# Patient Record
Sex: FEMALE | URBAN - METROPOLITAN AREA
[De-identification: names, ages, dates, MRNs, and addresses within clinical notes are randomized per-mention and may not be internally consistent; named-entity substitution may affect disease eponyms.]

---

## 2018-04-05 ENCOUNTER — OFFICE VISIT (OUTPATIENT)
Dept: NEUROPSYCHOLOGY | Facility: CLINIC | Age: 58
End: 2018-04-05
Payer: COMMERCIAL

## 2018-04-05 DIAGNOSIS — G40.019 LOCALIZATION-RELATED IDIOPATHIC EPILEPSY AND EPILEPTIC SYNDROMES WITH SEIZURES OF LOCALIZED ONSET, INTRACTABLE, WITHOUT STATUS EPILEPTICUS (H): Primary | ICD-10-CM

## 2018-04-05 DIAGNOSIS — R41.841 COGNITIVE COMMUNICATION DEFICIT: ICD-10-CM

## 2018-04-05 DIAGNOSIS — F33.1 MAJOR DEPRESSIVE DISORDER, RECURRENT EPISODE, MODERATE (H): ICD-10-CM

## 2018-04-05 NOTE — MR AVS SNAPSHOT
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 With M[employer]ow, you can request a clinic appointment, read your test results, renew a prescription or communicate with your care team.     To sign up for EBDSoftt visit the website at [email or web address]   You will be asked to enter the access code listed below, as well as some personal information. Please follow the directions to create your username and password.     Your access code is: [email or web address]  Expires: 2018  7:30 AM     Your access code will  in 90 days. If you need help or a new code, please contact your [facility] or call [phone] for assistance.        Care EveryWhere ID     This is your Care EveryWhere ID. This could be used by other organizations to access your [facility] medical records  [id number]         Blood Pressure from Last 3 Encounters:   No data found for BP    Weight from Last 3 Encounters:   No data found for Wt              We Performed the Following     [id number]  TESTING, PER HR/PSYCHOLOGIST     NEUROPSYCH TESTING BY [facility]        Primary Care Provider Office Phone # Fax #    Tabby Rucker [phone]       [facility] [address] [address]        Equal Access to Services     KASANDRA CEDENO : Hadii aad ku hadhoneyadalid Soomaali, waaxda luqadaha, qaybta kaalmada adeegyada, waxay idiin haytonoelva wheelernelarosalinda benavides. So [facility] [phone].    ATENCIÓN: Si habla español, tiene a phoenix disposición servicios gratuitos de asistencia lingüística. Llame al [phone].    We comply with applicable federal civil rights laws and [place] laws. We do not discriminate on the basis of race, color, national origin, age, disability, sex, sexual orientation, or gender identity.            Thank you!     Thank you for choosing [facility] NEUROPSYCHOLOGY  for your care. Our goal is always to provide you with excellent care. Hearing back from our patients is one way we can continue to improve our services. Please take a few minutes to complete the written survey that you may receive in the mail after your visit with us. Thank you!             Your Updated Medication List - Protect others around you: Learn how to safely use, store and throw away your medicines at www.disposemymeds.org.      Notice  As of 4/5/2018 11:59 PM    You have not been prescribed any medications.
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
## 2018-04-06 NOTE — NURSING NOTE
The patient was seen for neuropsychological evaluation at the request of Dr. Sydney Shay for the purpose of diagnostic clarification and treatment planning.  2 hours of face to face testing were provided by this writer.  Please see Dr. Paul Shrestha's report for a full interpretation of the findings.

## 2018-04-06 NOTE — PROGRESS NOTES
__ Orientation            Time          __0____        Place        ____1____        Personal Info.     ____4____    WAIS-IV   FSIQ____VCI_____PRI_____ WMI_77__PSI_       Raw  Age SS  __Similarities  __17___ __6__  __Vocabulary  _______ _______  __Information  _______          _______  __Comprehension _______ _______  __Block Design  __24___ __7__  __Matrix Reas.  _____  _____  __Visual Puzzles _______ _______  __Picture Comp. _______ _______  __Figure Weights _______ _______  __Digit Span  __ 22___ __8___  __Arithmetic  __ 7___               __4__  __L-N Sequencing _______ _______  __Symbol Search _____  _____  __Coding  __32___ ___2___  __Cancellation  _______ _______    __AVLT  Trial   1         2          3        4          5         B          6            _5_    _7_     _6_     _8_     _7_     _4_     _6_      Raw    %ile  Learning   _33___    Short Delay   _6___    8-16  30 min. delayed recall  _4___   3-7  Recognition hits   _7__     3  Recognition false positives _1___        -    __Renoe-Alma/Jackie Complex Figure Test    Raw  T-score   %ile  Copy   __21.5_         -                  ?1  Imm. Recall __35_  __<20___ _<1__  30  Delay __2_  ___<20___ _<1__  Recognition __11__   ___<20___ __<1__  Time               _176                     _>16__    __BVRT  (form C)  Copy E-10 rating ____/4     Raw    expected  Correct  ___3____ _7__  Incorrect ___12___ _3__    __WRAT-4   Reading SS = 109     %ile = 73    GE = >12.9    __COWAT   Raw__26___ Tscore__39___   __Semantic Fluency/Animals   Raw = 13  Tscore = 21  __BNT   Raw = 44/60 %ile = <2  __Complex Ideational Material   Raw = 10/12 Tscore = 37    __Trail Making Test   A =   86         Errors = 0    %ile = 2   B = 257         Errors = 1    %ile = <2  __Stroop                       Raw         %ile        Word = _64_      _<2__        Color =  _41_      _2-3__        C/W   =  _21_     _2-3__    __Benton Facial Recognition   Raw = 44     Interp=  Normal    __Grooved Pegboard   RH = 176          Tscore = 22      Drops = 0   LH = 143           TScore = 21      Drops = 0  __Grip Strength   RH = 11.67 T = 21   LH = 23.67 T = 29    __BDI-II   Raw__23__ Interp.__Moderate___   __BAI     Raw__4__ Interp. __Minimal___    __WMS-III   LM1 = 29  SS = 7   LM2 = 15  SS = 8   LM2R = 22 Zscore = -0.81   Faces1= 42 SS= 15   Faces2= 40 SS= 14

## 2018-10-10 ENCOUNTER — HEALTH MAINTENANCE LETTER (OUTPATIENT)
Age: 58
End: 2018-10-10